# Patient Record
Sex: FEMALE | Race: WHITE | NOT HISPANIC OR LATINO | URBAN - METROPOLITAN AREA
[De-identification: names, ages, dates, MRNs, and addresses within clinical notes are randomized per-mention and may not be internally consistent; named-entity substitution may affect disease eponyms.]

---

## 2023-01-03 ENCOUNTER — TELEPHONE (OUTPATIENT)
Dept: SCHEDULING | Facility: CLINIC | Age: 43
End: 2023-01-03
Payer: COMMERCIAL

## 2023-01-03 NOTE — TELEPHONE ENCOUNTER
New Patient Appointment FYI    Name of caller: Yvonne Harmon     Reason for Visit: Possible POTS    Insurance: [employer]    Insurance ID #: [id number]    Recent Procedures: none    Referred by: Jenelle Frank     Previous Cardiologist name and phone number: none    Best contact number: [phone]     Additional notes: NPV scheduled 1/23 at 11AM.

## 2023-01-23 ENCOUNTER — OFFICE VISIT (OUTPATIENT)
Dept: CARDIOLOGY | Facility: CLINIC | Age: 43
End: 2023-01-23
Payer: COMMERCIAL

## 2023-01-23 VITALS — SYSTOLIC BLOOD PRESSURE: 134 MMHG | OXYGEN SATURATION: 99 % | HEART RATE: 61 BPM | DIASTOLIC BLOOD PRESSURE: 82 MMHG

## 2023-01-23 DIAGNOSIS — G90.A POTS (POSTURAL ORTHOSTATIC TACHYCARDIA SYNDROME): ICD-10-CM

## 2023-01-23 DIAGNOSIS — R42 DIZZINESS: Primary | ICD-10-CM

## 2023-01-23 PROBLEM — K21.9 LARYNGOPHARYNGEAL REFLUX: Status: ACTIVE | Noted: 2023-01-23

## 2023-01-23 PROBLEM — M79.7 FIBROMYALGIA: Status: ACTIVE | Noted: 2021-04-08

## 2023-01-23 PROBLEM — E55.9 VITAMIN D DEFICIENCY: Status: ACTIVE | Noted: 2022-08-01

## 2023-01-23 PROBLEM — G43.019 INTRACTABLE MIGRAINE WITHOUT AURA AND WITHOUT STATUS MIGRAINOSUS: Status: ACTIVE | Noted: 2020-10-23

## 2023-01-23 PROBLEM — F98.8 ADD (ATTENTION DEFICIT DISORDER): Status: ACTIVE | Noted: 2023-01-23

## 2023-01-23 PROBLEM — F50.819 BINGE EATING DISORDER: Status: ACTIVE | Noted: 2022-02-08

## 2023-01-23 PROBLEM — R49.0 HOARSENESS: Status: ACTIVE | Noted: 2023-01-23

## 2023-01-23 PROBLEM — R41.840 ATTENTION DEFICIT: Status: ACTIVE | Noted: 2022-02-08

## 2023-01-23 PROBLEM — J02.9 SORE THROAT: Status: ACTIVE | Noted: 2023-01-23

## 2023-01-23 PROBLEM — M25.552 LEFT HIP PAIN: Status: ACTIVE | Noted: 2020-10-23

## 2023-01-23 PROCEDURE — 93000 ELECTROCARDIOGRAM COMPLETE: CPT | Performed by: INTERNAL MEDICINE

## 2023-01-23 PROCEDURE — 99204 OFFICE O/P NEW MOD 45 MIN: CPT | Performed by: INTERNAL MEDICINE

## 2023-01-23 RX ORDER — LISDEXAMFETAMINE DIMESYLATE 50 MG/1
50 CAPSULE ORAL DAILY
COMMUNITY
Start: 2022-04-22 | End: 2023-03-29

## 2023-01-23 RX ORDER — LEVONORGESTREL 52 MG/1
1 INTRAUTERINE DEVICE INTRAUTERINE
COMMUNITY

## 2023-01-23 RX ORDER — ERGOCALCIFEROL 1.25 MG/1
1 CAPSULE ORAL WEEKLY
COMMUNITY
Start: 2022-12-29 | End: 2023-03-29

## 2023-01-23 RX ORDER — METOPROLOL SUCCINATE 50 MG/1
50 TABLET, EXTENDED RELEASE ORAL DAILY
Qty: 90 TABLET | Refills: 3 | Status: SHIPPED | OUTPATIENT
Start: 2023-01-23 | End: 2024-01-23

## 2023-01-23 RX ORDER — RIZATRIPTAN BENZOATE 10 MG/1
TABLET ORAL
COMMUNITY
Start: 2023-01-03

## 2023-01-23 ASSESSMENT — ENCOUNTER SYMPTOMS
SUSPICIOUS LESIONS: 0
LEFT EYE: 0
FLANK PAIN: 0
IRREGULAR HEARTBEAT: 0
PERSISTENT INFECTIONS: 0
LOSS OF BALANCE: 0
WHEEZING: 0
BACK PAIN: 0
HOARSE VOICE: 0
SENSORY CHANGE: 0
NEAR-SYNCOPE: 1
MYALGIAS: 0
WEAKNESS: 0
SLEEP DISTURBANCES DUE TO BREATHING: 0
ORTHOPNEA: 0
NUMBNESS: 0
SYNCOPE: 1
NIGHT SWEATS: 0
ANOREXIA: 0
RIGHT EYE: 0
POOR WOUND HEALING: 0
CHANGE IN BOWEL HABIT: 0
TREMORS: 0
BLURRED VISION: 0
JOINT SWELLING: 0
CLAUDICATION: 0
COUGH: 0
DIZZINESS: 0
SHORTNESS OF BREATH: 0
HEMATEMESIS: 0
PALPITATIONS: 1
PND: 0
DYSPNEA ON EXERTION: 0
BOWEL INCONTINENCE: 0
DECREASED APPETITE: 0

## 2023-01-23 NOTE — ASSESSMENT & PLAN NOTE
Correlating with fast HR and orthostasis.  EKG and exam are low risk.  Would defer imaging as prior TTE was normal.  Encouraged HR control and hydration as above to manage symptoms.    Counterpressure maneuvers counseling done to prevent syncope.

## 2023-01-23 NOTE — PROGRESS NOTES
.  Yvonne Harmon is a 42 y.o. female is present in the office today for new patient appt    With position changes she gets dizzy/LH/nauseous/palpitations.  Has fainted 2x and now sits down when she feels like close to syncope.  She had a prior trial of propanolol but did not improve the migraines or palpitations.      She eats balanced meals, eggs, varied lunches, meats/vegetables/carbohydrates, not drinking much water.  30-40oz a day.  She can walk without exertional symptoms.  No real improvement with exercise.  Worse symptoms when she gets hot/sweaty.  Eating more salt helps with the symptoms.      These symptoms have been going on for years, and always when standing.  HR can rise to 160BPM when standing.  When hr exceeds 138-140BPM symptoms get worse.  She didn't have improvement in Hrs with the Propranolol which was stopped when it failed to improve migraines.      Some improvement when drinking electrolyte solutions in the AM after her bout of syncope several years ago.             Past Medical History:   Diagnosis Date   • ADD (attention deficit disorder)    • Fibromyalgia    • Migraines    • Vitamin D deficiency        Past Surgical History:   Procedure Laterality Date   • APPENDECTOMY     •  SECTION     • GASTRIC BYPASS     • TONSILLECTOMY          Social History     Socioeconomic History   • Marital status:      Spouse name: None   • Number of children: None   • Years of education: None   • Highest education level: None   Tobacco Use   • Smoking status: Never   • Smokeless tobacco: Never   Substance and Sexual Activity   • Alcohol use: Not Currently   • Drug use: Not Currently       Family History   Problem Relation Age of Onset   • Hypertension Biological Mother    • Hypertension Biological Father        Patient has no known allergies.    Current Outpatient Medications   Medication Sig Dispense Refill   • ergocalciferol (VITAMIN D2) 50,000 unit(1250 mcg) capsule Take 1 capsule by mouth  once a week.     • levonorgestreL (LILETTA) 18.6 mcg/24 hrs (6 yrs) 52 mg intrauterine device intrauterine device 1 mg.     • lisdexamfetamine (VYVANSE) 50 mg capsule Take 50 mg by mouth daily.     • metoprolol succinate XL (TOPROL-XL) 50 mg 24 hr tablet Take 1 tablet (50 mg total) by mouth daily. 90 tablet 3   • rizatriptan (MAXALT) 10 mg tablet TAKE ONE TABLET BY MOUTH ONCE A DAY AS NEEDED  MIGRAINE) FOR UP TO 1 DOSE. REPEAT AFTER 2 HOURS IF NEEDED, MAXIMUM OF 2 TABLETS IN 24 HOURS.       No current facility-administered medications for this visit.       Review of Systems   Constitutional: Negative for decreased appetite and night sweats.   HENT: Negative for congestion and hoarse voice.    Eyes: Negative for blurred vision, vision loss in left eye and vision loss in right eye.   Cardiovascular: Positive for near-syncope, palpitations and syncope. Negative for chest pain, claudication, cyanosis, dyspnea on exertion, irregular heartbeat, leg swelling, orthopnea and paroxysmal nocturnal dyspnea.   Respiratory: Negative for cough, shortness of breath, sleep disturbances due to breathing and wheezing.    Hematologic/Lymphatic: Negative for bleeding problem.   Skin: Negative for poor wound healing, skin cancer and suspicious lesions.   Musculoskeletal: Negative for back pain, joint swelling and myalgias.   Gastrointestinal: Negative for anorexia, change in bowel habit, bowel incontinence, dysphagia, hematemesis and melena.   Genitourinary: Negative for flank pain and nocturia.   Neurological: Negative for dizziness, loss of balance, numbness, sensory change, tremors and weakness.   Allergic/Immunologic: Negative for HIV exposure, hives and persistent infections.          Vitals:    01/23/23 1053   BP: 134/82   Pulse: 61   SpO2: 99%       There is no height or weight on file to calculate BMI.      Physical Exam  Constitutional:       Appearance: Normal appearance. She is normal weight.   HENT:      Head: Normocephalic.       Mouth/Throat:      Mouth: Mucous membranes are moist.      Pharynx: No posterior oropharyngeal erythema.   Eyes:      Extraocular Movements: Extraocular movements intact.   Cardiovascular:      Rate and Rhythm: Regular rhythm. Bradycardia present.      Chest Wall: PMI is not displaced.      Pulses:           Carotid pulses are 2+ on the right side and 2+ on the left side.       Radial pulses are 2+ on the right side and 2+ on the left side.        Posterior tibial pulses are 2+ on the right side and 2+ on the left side.      Heart sounds: S1 normal and S2 normal. No murmur heard.    No gallop. No S3 or S4 sounds.      Comments: Increase HR variability.  Standing HR goes 90s-110s then back to 90s-110s.    Pulmonary:      Effort: Pulmonary effort is normal.      Breath sounds: Normal breath sounds.   Abdominal:      General: Abdomen is flat. Bowel sounds are normal. There is no distension.      Tenderness: There is no abdominal tenderness.   Musculoskeletal:      Cervical back: Neck supple.      Comments: Functional status is:  good   Skin:     General: Skin is warm.   Neurological:      Mental Status: She is alert and oriented to person, place, and time. Mental status is at baseline.   Psychiatric:         Attention and Perception: Attention normal.         Mood and Affect: Mood and affect normal.         Speech: Speech normal.          No results found for: WBC, RBC, HGB, HCT, MCV, MCH, MCHC, RDW, PLT     No results found for: GLUCOSE, BUN, CREATININE, EGFR, BUNCREAT, NA, K, CL, CO2, CALCIUM, PROTEIN, ALBUMIN, GLOB, AGRATIO, BILITOT, ALKPHOS, AST, ALT     No results found for: HGBA1C, ESTAVGGLUC     No results found for: ALBUMIN     No results found for: CHOL, TRIG, HDL, VLDL, LDLCALC         No results found for this or any previous visit.               Assessment/Plan        EKG: Sinus tachycardia rhythm, nonspecific ST changes, low risk EKG    POTS (postural orthostatic tachycardia syndrome)  Discussed  ACC/AHA Expert consensus guidelines for POTS:    Plan- Toprol for tachycardias- starting at 50mg and uptitrating as tolerated.      Increase electrolytes and fluids    Seated exercise can benefit suggest bike vs. Rower    Limit caffeine.      Fine to mix stimulants and beta blockers though reducing stimulants makes more sense.      Dizziness  Correlating with fast HR and orthostasis.  EKG and exam are low risk.  Would defer imaging as prior TTE was normal.  Encouraged HR control and hydration as above to manage symptoms.    Counterpressure maneuvers counseling done to prevent syncope.       I spent a total of 45min on exam, interview, documentation and counseling.      New Medications Ordered This Visit   Medications   • ergocalciferol (VITAMIN D2) 50,000 unit(1250 mcg) capsule     Sig: Take 1 capsule by mouth once a week.   • levonorgestreL (LILETTA) 18.6 mcg/24 hrs (6 yrs) 52 mg intrauterine device intrauterine device     Si mg.   • lisdexamfetamine (VYVANSE) 50 mg capsule     Sig: Take 50 mg by mouth daily.   • rizatriptan (MAXALT) 10 mg tablet     Sig: TAKE ONE TABLET BY MOUTH ONCE A DAY AS NEEDED  MIGRAINE) FOR UP TO 1 DOSE. REPEAT AFTER 2 HOURS IF NEEDED, MAXIMUM OF 2 TABLETS IN 24 HOURS.   • metoprolol succinate XL (TOPROL-XL) 50 mg 24 hr tablet     Sig: Take 1 tablet (50 mg total) by mouth daily.     Dispense:  90 tablet     Refill:  3       Medications Discontinued During This Encounter   Medication Reason   • PNV,calcium 72-iron-folic acid 27 mg iron- 1 mg tablet         Orders Placed This Encounter   Procedures   • ECG 12 LEAD-OFFICE PERFORMED     Scheduling Instructions:      PLEASE USE THIS ORDER FOR ECG'S PERFORMED IN PHYSICIAN OFFICES     Order Specific Question:   Release to patient     Answer:   Immediate        Leonel Harding MD  2023

## 2023-01-23 NOTE — PATIENT INSTRUCTIONS
Toprol for tachycardias    Increase electrolytes and fluids    Seated exercise can benefit suggest bike vs. Rower    Limit caffeine.      Can add a second tablet if desired for total dose 100mg and call me if this is better for your body.

## 2023-01-23 NOTE — ASSESSMENT & PLAN NOTE
Discussed ACC/AHA Expert consensus guidelines for POTS:    Plan- Toprol for tachycardias- starting at 50mg and uptitrating as tolerated.      Increase electrolytes and fluids    Seated exercise can benefit suggest bike vs. Rower    Limit caffeine.      Fine to mix stimulants and beta blockers though reducing stimulants makes more sense.

## 2024-10-22 ENCOUNTER — OFFICE VISIT (OUTPATIENT)
Dept: FAMILY MEDICINE CLINIC | Facility: CLINIC | Age: 44
End: 2024-10-22
Payer: COMMERCIAL

## 2024-10-22 ENCOUNTER — DOCUMENTATION (OUTPATIENT)
Dept: FAMILY MEDICINE CLINIC | Facility: CLINIC | Age: 44
End: 2024-10-22

## 2024-10-22 VITALS
SYSTOLIC BLOOD PRESSURE: 118 MMHG | HEIGHT: 64 IN | WEIGHT: 211.4 LBS | OXYGEN SATURATION: 100 % | BODY MASS INDEX: 36.09 KG/M2 | DIASTOLIC BLOOD PRESSURE: 70 MMHG | HEART RATE: 95 BPM | TEMPERATURE: 99.4 F

## 2024-10-22 DIAGNOSIS — G90.A POTS (POSTURAL ORTHOSTATIC TACHYCARDIA SYNDROME): Primary | ICD-10-CM

## 2024-10-22 DIAGNOSIS — Z13.6 SCREENING FOR CARDIOVASCULAR CONDITION: ICD-10-CM

## 2024-10-22 DIAGNOSIS — Z76.89 ENCOUNTER TO ESTABLISH CARE: ICD-10-CM

## 2024-10-22 DIAGNOSIS — G90.A POSTURAL ORTHOSTATIC TACHYCARDIA SYNDROME: Primary | ICD-10-CM

## 2024-10-22 DIAGNOSIS — L50.8 CHRONIC URTICARIA: ICD-10-CM

## 2024-10-22 DIAGNOSIS — Z13.1 SCREENING FOR DIABETES MELLITUS: ICD-10-CM

## 2024-10-22 DIAGNOSIS — M35.7 HYPERMOBILITY SYNDROME: ICD-10-CM

## 2024-10-22 DIAGNOSIS — E61.1 HYPOFERREMIA: ICD-10-CM

## 2024-10-22 PROBLEM — F41.0 PANIC ATTACK: Status: ACTIVE | Noted: 2023-05-05

## 2024-10-22 PROBLEM — Z98.84 HISTORY OF GASTRIC BYPASS: Status: ACTIVE | Noted: 2020-10-23

## 2024-10-22 PROBLEM — F50.819 BINGE EATING DISORDER: Chronic | Status: ACTIVE | Noted: 2022-02-08

## 2024-10-22 PROBLEM — E16.1 REACTIVE HYPOGLYCEMIA: Status: ACTIVE | Noted: 2020-10-22

## 2024-10-22 PROBLEM — F41.1 GAD (GENERALIZED ANXIETY DISORDER): Status: ACTIVE | Noted: 2023-08-11

## 2024-10-22 PROBLEM — F98.8 ADD (ATTENTION DEFICIT DISORDER): Status: ACTIVE | Noted: 2022-09-22

## 2024-10-22 PROBLEM — E55.9 VITAMIN D DEFICIENCY: Status: ACTIVE | Noted: 2022-08-01

## 2024-10-22 PROCEDURE — 99203 OFFICE O/P NEW LOW 30 MIN: CPT

## 2024-10-22 RX ORDER — FERROUS SULFATE 325(65) MG
1 TABLET ORAL
COMMUNITY

## 2024-10-22 RX ORDER — OMEGA-3S/DHA/EPA/FISH OIL/D3 300MG-1000
400 CAPSULE ORAL DAILY
COMMUNITY

## 2024-10-22 NOTE — PROGRESS NOTES
"Ambulatory Visit  Name: Rupinder Pressley      : 1980      MRN: [id number]  Encounter Provider: Albina Grant DO  Encounter Date: 10/22/2024   Encounter department: [facility]    Assessment & Plan  Postural orthostatic tachycardia syndrome  Suspect possible EDS, longstanding history of FM, POTS,   Multiple symptoms including worsening fatigue, MSK symptoms (muscle cramps, back pain/stiffness, disjointed joints, etc), neurologic symptoms (headache, presyncope, slurred speech/word finding difficulties/brain fog/loss of balance/coordination, fine motor difficulties), GYN symptoms (urinary frequency, low libido, painful intercourse), mental health symptoms (depression, anxiety, impulsive)  Experiencing whole-body flareups over the past year  Plan:  F/u with Dr. Newman for EDS evaluation  Discussed getting blood work that was ordered by Dr. Newman  Prostaglandins, histamine, MTHFR mutation, Thyroid antibiodies, HLA-B27, Gluten sensitivity, celiac, Mg, LULY, Vit D, Allergy panel, tryptase, vitamin b12, irone panel, aldosterone/renin, anti-DNA, RF, CCP, Leukotriene, N-methylhistamine       Encounter to establish care         Screening for cardiovascular condition    Orders:    Lipid panel; Future    Screening for diabetes mellitus    Orders:    Comprehensive metabolic panel; Future         Tobacco and Toxic Substance Assessment and Intervention:     Tobacco use screening performed    Alcohol and drug use screening performed      History of Present Illness     HPI    Pt presenting today to establish care with a new PCP. Interested in establishing with Dr. Newman for EDS evaluation      Acute Concerns:  EDS evaluation:  Reports she has been dealing with symptoms since she was a teenager.  Reports that she first thought it was growing pains, however when she hit her adult years she continued to have \" growing pains.\"  She reports ongoing migraines and headaches.  Feels like over the " "past year her flareups have been severely worse.  Described flareups as a \"whole body headache,\" and alleviated by Tylenol.  She reports extreme fatigue, noted to have difficulty getting out of bed some days.  Will quickly fatigue while doing activities.  She is no longer taking Wellbutrin or Adderall for anxiety and ADHD. Below is the list of symptoms she has been experiencing:        PMHx:  Anxiety, Depression, POTS, Fibromyalgia, ADHD, reactive hypoglycemia       Medications:  Only vitamin D and iron.     Allergies:  Hydroxyzine makes extremely tired, not a true allergy but severe side effect      Hospitalizations:  Hospitalized in 2016 after hemorrhaging after miscarriage, hospitalized for 3 days.      Surgeries:  Appendectomy- 6 years   Tonsillectomy-   C section- , twins, was  a serrogate   - miscarrage spontaneous, hemmorrhage  Gastric bypass -        Social Hx:  Caffeine use? Yes   Smokes? - denies, history of sporadic tobacco as teen  Drinks? Denies   Illicit drug use?Denies   Occupation -  at [employer]  Living situation:  and son, 18  Drives? Yes      Family Hx:  Cancer? None  Stroke: None  MI: maybe someone, unsure  HTN- Dad,   HLD- dad  Diabetes- mom and maternal grandfather      Medical History Reviewed by provider this encounter:       Past Medical History   Past Medical History:   Diagnosis Date    Asthma     Depression     Fibromyalgia     REBECCA (generalized anxiety disorder)     History of gastric bypass     Migraines     Miscarriage     POTS (postural orthostatic tachycardia syndrome)     Reactive hypoglycemia      Past Surgical History:   Procedure Laterality Date    APPENDECTOMY      8 years old     SECTION  2012    surrogate with twins    GASTRIC BYPASS      TONSILLECTOMY       Family History   Problem Relation Age of Onset    Diabetes Mother     Hypertension Father     Hyperlipidemia Father     Diabetes Maternal Grandfather      Current Outpatient " "Medications on File Prior to Visit   Medication Sig Dispense Refill    cholecalciferol (VITAMIN D3) 400 units tablet Take 400 Units by mouth daily      FeroSul 325 (65 Fe) MG tablet Take 1 tablet by mouth daily with breakfast       No current facility-administered medications on file prior to visit.     Allergies   Allergen Reactions    Hydroxyzine Other (See Comments)      Current Outpatient Medications on File Prior to Visit   Medication Sig Dispense Refill    cholecalciferol (VITAMIN D3) 400 units tablet Take 400 Units by mouth daily      FeroSul 325 (65 Fe) MG tablet Take 1 tablet by mouth daily with breakfast       No current facility-administered medications on file prior to visit.      Social History     Tobacco Use    Smoking status: Former     Types: Cigarettes    Smokeless tobacco: Never   Vaping Use    Vaping status: Never Used   Substance and Sexual Activity    Alcohol use: Never    Drug use: Never    Sexual activity: Not on file         Objective     /70 (BP Location: Right arm, Patient Position: Sitting, Cuff Size: Large)   Pulse 95   Temp 99.4 °F (37.4 °C) (Tympanic)   Ht 5' 4\" (1.626 m)   Wt 95.9 kg (211 lb 6.4 oz)   SpO2 100%   BMI 36.29 kg/m²     Physical Exam  Vitals and nursing note reviewed.   Constitutional:       General: She is not in acute distress.     Appearance: She is well-developed. She is not ill-appearing.   HENT:      Head: Normocephalic and atraumatic.   Eyes:      Conjunctiva/sclera: Conjunctivae normal.   Cardiovascular:      Rate and Rhythm: Normal rate and regular rhythm.      Heart sounds: No murmur heard.  Pulmonary:      Effort: Pulmonary effort is normal. No respiratory distress.      Breath sounds: Normal breath sounds.   Abdominal:      General: There is no distension.      Palpations: Abdomen is soft. There is no mass.      Tenderness: There is no abdominal tenderness. There is no right CVA tenderness or guarding.      Hernia: No hernia is present. "   Musculoskeletal:         General: No swelling.      Cervical back: Neck supple.      Right lower leg: No edema.      Left lower leg: No edema.   Skin:     General: Skin is warm and dry.      Capillary Refill: Capillary refill takes less than 2 seconds.   Neurological:      Mental Status: She is alert.   Psychiatric:         Mood and Affect: Mood is depressed.

## 2024-10-22 NOTE — ASSESSMENT & PLAN NOTE
Suspect possible EDS, longstanding history of FM, POTS,   Multiple symptoms including worsening fatigue, MSK symptoms (muscle cramps, back pain/stiffness, disjointed joints, etc), neurologic symptoms (headache, presyncope, slurred speech/word finding difficulties/brain fog/loss of balance/coordination, fine motor difficulties), GYN symptoms (urinary frequency, low libido, painful intercourse), mental health symptoms (depression, anxiety, impulsive)  Experiencing whole-body flareups over the past year  Plan:  F/u with Dr. Newman for EDS evaluation  Discussed getting blood work that was ordered by Dr. Newman  Prostaglandins, histamine, MTHFR mutation, Thyroid antibiodies, HLA-B27, Gluten sensitivity, celiac, Mg, LULY, Vit D, Allergy panel, tryptase, vitamin b12, irone panel, aldosterone/renin, anti-DNA, RF, CCP, Leukotriene, N-methylhistamine

## 2024-10-24 LAB
25(OH)D3+25(OH)D2 SERPL-MCNC: 20 NG/ML (ref 30–100)
ALBUMIN SERPL-MCNC: 4.6 G/DL (ref 3.9–4.9)
ALP SERPL-CCNC: 82 IU/L (ref 44–121)
ALT SERPL-CCNC: 22 IU/L (ref 0–32)
ANA TITR SER IF: NEGATIVE {TITER}
AST SERPL-CCNC: 17 IU/L (ref 0–40)
BILIRUB SERPL-MCNC: 0.5 MG/DL (ref 0–1.2)
BUN SERPL-MCNC: 10 MG/DL (ref 6–24)
BUN/CREAT SERPL: 14 (ref 9–23)
CALCIUM SERPL-MCNC: 9.5 MG/DL (ref 8.7–10.2)
CCP IGA+IGG SERPL IA-ACNC: 9 UNITS (ref 0–19)
CHLORIDE SERPL-SCNC: 105 MMOL/L (ref 96–106)
CHOLEST SERPL-MCNC: 164 MG/DL (ref 100–199)
CO2 SERPL-SCNC: 19 MMOL/L (ref 20–29)
CREAT SERPL-MCNC: 0.72 MG/DL (ref 0.57–1)
DSDNA AB SER-ACNC: <1 IU/ML (ref 0–9)
EGFR: 107 ML/MIN/1.73
GLOBULIN SER-MCNC: 2.1 G/DL (ref 1.5–4.5)
GLUCOSE SERPL-MCNC: 82 MG/DL (ref 70–99)
HDLC SERPL-MCNC: 73 MG/DL
IRON SATN MFR SERPL: 25 % (ref 15–55)
IRON SERPL-MCNC: 113 UG/DL (ref 27–159)
LDLC SERPL CALC-MCNC: 78 MG/DL (ref 0–99)
MAGNESIUM SERPL-MCNC: 2 MG/DL (ref 1.6–2.3)
POTASSIUM SERPL-SCNC: 4.5 MMOL/L (ref 3.5–5.2)
PROT SERPL-MCNC: 6.7 G/DL (ref 6–8.5)
RHEUMATOID FACT SERPL-ACNC: <10 IU/ML
SL AMB VLDL CHOLESTEROL CALC: 13 MG/DL (ref 5–40)
SODIUM SERPL-SCNC: 142 MMOL/L (ref 134–144)
THYROGLOB AB SERPL-ACNC: <1 IU/ML (ref 0–0.9)
THYROPEROXIDASE AB SERPL-ACNC: 12 IU/ML (ref 0–34)
TIBC SERPL-MCNC: 453 UG/DL (ref 250–450)
TRIGL SERPL-MCNC: 66 MG/DL (ref 0–149)
UIBC SERPL-MCNC: 340 UG/DL (ref 131–425)
VIT B12 SERPL-MCNC: 432 PG/ML (ref 232–1245)

## 2024-10-27 LAB
A ALTERNATA IGE QN: NORMAL
A FUMIGATUS IGE QN: NORMAL
ALDOST SERPL-MCNC: NORMAL NG/DL
ALDOST/RENIN PLAS-RTO: NORMAL {RATIO}
BERMUDA GRASS IGE QN: NORMAL
BOXELDER IGE QN: NORMAL
C HERBARUM IGE QN: NORMAL
CAT DANDER IGE QN: NORMAL
CMN PIGWEED IGE QN: NORMAL
COMMON RAGWEED IGE QN: NORMAL
COTTONWOOD IGE QN: NORMAL
D FARINAE IGE QN: NORMAL
D PTERONYSS IGE QN: NORMAL
DOG DANDER IGE QN: NORMAL
ENDOMYSIUM IGA SER QL: NEGATIVE
GLIADIN IGG SER IA-ACNC: NORMAL
GLIADIN PEPTIDE IGG SER-ACNC: 2 UNITS (ref 0–19)
HISTAMINE SERPL-MCNC: NORMAL NG/ML
HLA-B27 QL NAA+PROBE: NORMAL
IGA SERPL-MCNC: 175 MG/DL (ref 87–352)
IGE SERPL-ACNC: NORMAL [IU]/L
LONDON PLANE IGE QN: NORMAL
Lab: NORMAL
Lab: NORMAL
MOUSE URINE PROT IGE QN: NORMAL
MT JUNIPER IGE QN: NORMAL
MTHFR GENE MUT ANL BLD/T: NORMAL
MUGWORT IGE QN: NORMAL
PENICILLIUM CHRYSOGENUM: NORMAL
PROSTAGLANDIN D2 SERPL-MCNC: NORMAL PG/ML
RENIN PLAS-CCNC: NORMAL NG/ML/H
ROACH IGE QN: NORMAL
SHEEP SORREL IGE QN: NORMAL
SILVER BIRCH IGE QN: NORMAL
TEST INFORMATION: NORMAL
TIMOTHY IGE QN: NORMAL
TRYPTASE SERPL-MCNC: 3.8 UG/L (ref 2.2–13.2)
TTG IGA SER-ACNC: <2 U/ML (ref 0–3)
TTG IGA SER-ACNC: NORMAL U/ML
WALNUT IGE: NORMAL
WHITE ASH IGE QN: NORMAL
WHITE ELM IGE QN: NORMAL
WHITE MULBERRY IGE QN: NORMAL
WHITE OAK IGE QN: NORMAL

## 2024-10-28 LAB
ALDOST SERPL-MCNC: 1.1 NG/DL (ref 0–30)
ALDOST/RENIN PLAS-RTO: 3.4 {RATIO} (ref 0–30)
RENIN PLAS-CCNC: 0.32 NG/ML/HR (ref 0.17–5.38)

## 2024-10-29 LAB
A ALTERNATA IGE QN: <0.1 KU/L
A FUMIGATUS IGE QN: <0.1 KU/L
BERMUDA GRASS IGE QN: <0.1 KU/L
BOXELDER IGE QN: <0.1 KU/L
C HERBARUM IGE QN: <0.1 KU/L
CAT DANDER IGE QN: <0.1 KU/L
CMN PIGWEED IGE QN: <0.1 KU/L
COMMON RAGWEED IGE QN: <0.1 KU/L
COTTONWOOD IGE QN: <0.1 KU/L
D FARINAE IGE QN: <0.1 KU/L
D PTERONYSS IGE QN: <0.1 KU/L
DOG DANDER IGE QN: <0.1 KU/L
GLIADIN IGG SER IA-ACNC: 30 UNITS (ref 0–19)
GLIADIN PEPTIDE IGG SER-ACNC: 2 UNITS (ref 0–19)
HLA-B27 QL NAA+PROBE: NEGATIVE
IGE SERPL-ACNC: 16 IU/ML (ref 6–495)
LONDON PLANE IGE QN: <0.1 KU/L
Lab: NORMAL
MOUSE URINE PROT IGE QN: <0.1 KU/L
MT JUNIPER IGE QN: <0.1 KU/L
MUGWORT IGE QN: <0.1 KU/L
NOTE: NORMAL
PENICILLIUM CHRYSOGENUM: <0.1 KU/L
ROACH IGE QN: <0.1 KU/L
SHEEP SORREL IGE QN: <0.1 KU/L
SILVER BIRCH IGE QN: <0.1 KU/L
TEST INFORMATION: NORMAL
TIMOTHY IGE QN: <0.1 KU/L
TTG IGA SER-ACNC: <2 U/ML (ref 0–3)
WALNUT IGE: <0.1 KU/L
WHITE ASH IGE QN: <0.1 KU/L
WHITE ELM IGE QN: <0.1 KU/L
WHITE MULBERRY IGE QN: <0.1 KU/L
WHITE OAK IGE QN: <0.1 KU/L

## 2024-10-30 LAB — HISTAMINE SERPL-MCNC: 0.54 NG/ML

## 2024-10-31 LAB
ENDOMYSIUM IGA SER QL: NEGATIVE
IGA SERPL-MCNC: 175 MG/DL (ref 87–352)
Lab: NORMAL
MTHFR GENE MUT ANL BLD/T: NORMAL
PROSTAGLANDIN D2 SERPL-MCNC: NORMAL PG/ML
TTG IGA SER-ACNC: NORMAL U/ML

## 2024-11-04 LAB
ENDOMYSIUM IGA SER QL: NEGATIVE
IGA SERPL-MCNC: 175 MG/DL (ref 87–352)
PROSTAGLANDIN D2 SERPL-MCNC: 8.4 PG/ML
TTG IGA SER-ACNC: NORMAL U/ML

## 2024-12-16 ENCOUNTER — OFFICE VISIT (OUTPATIENT)
Dept: FAMILY MEDICINE CLINIC | Facility: CLINIC | Age: 44
End: 2024-12-16
Payer: COMMERCIAL

## 2024-12-16 VITALS
SYSTOLIC BLOOD PRESSURE: 132 MMHG | HEART RATE: 100 BPM | TEMPERATURE: 97.5 F | DIASTOLIC BLOOD PRESSURE: 82 MMHG | OXYGEN SATURATION: 99 % | WEIGHT: 216.6 LBS | HEIGHT: 64 IN | BODY MASS INDEX: 36.98 KG/M2

## 2024-12-16 DIAGNOSIS — Q79.62 HYPERMOBILE EHLERS-DANLOS SYNDROME: Primary | ICD-10-CM

## 2024-12-16 DIAGNOSIS — R10.9 VISCERAL ABDOMINAL PAIN: ICD-10-CM

## 2024-12-16 DIAGNOSIS — J45.20 MILD INTERMITTENT ASTHMA WITHOUT COMPLICATION: ICD-10-CM

## 2024-12-16 DIAGNOSIS — Z98.84 HISTORY OF GASTRIC BYPASS: ICD-10-CM

## 2024-12-16 DIAGNOSIS — G89.29 CHRONIC PAIN OF RIGHT KNEE: ICD-10-CM

## 2024-12-16 DIAGNOSIS — M25.561 CHRONIC PAIN OF RIGHT KNEE: ICD-10-CM

## 2024-12-16 DIAGNOSIS — Z12.31 ENCOUNTER FOR SCREENING MAMMOGRAM FOR BREAST CANCER: ICD-10-CM

## 2024-12-16 DIAGNOSIS — E55.9 VITAMIN D DEFICIENCY: ICD-10-CM

## 2024-12-16 DIAGNOSIS — Z87.59 HISTORY OF MISCARRIAGE: ICD-10-CM

## 2024-12-16 DIAGNOSIS — Z15.89 MTHFR MUTATION: ICD-10-CM

## 2024-12-16 DIAGNOSIS — T14.8XXA BRUISING: ICD-10-CM

## 2024-12-16 DIAGNOSIS — D89.40 MAST CELL ACTIVATION (HCC): ICD-10-CM

## 2024-12-16 DIAGNOSIS — E53.8 LOW SERUM VITAMIN B12: ICD-10-CM

## 2024-12-16 DIAGNOSIS — E66.9 OBESITY (BMI 35.0-39.9 WITHOUT COMORBIDITY): ICD-10-CM

## 2024-12-16 DIAGNOSIS — I83.813 VARICOSE VEINS OF BILATERAL LOWER EXTREMITIES WITH PAIN: ICD-10-CM

## 2024-12-16 DIAGNOSIS — E61.1 HYPOFERREMIA: ICD-10-CM

## 2024-12-16 DIAGNOSIS — G43.009 MIGRAINE WITHOUT AURA AND WITHOUT STATUS MIGRAINOSUS, NOT INTRACTABLE: ICD-10-CM

## 2024-12-16 DIAGNOSIS — G90.A POTS (POSTURAL ORTHOSTATIC TACHYCARDIA SYNDROME): ICD-10-CM

## 2024-12-16 PROCEDURE — 99215 OFFICE O/P EST HI 40 MIN: CPT | Performed by: FAMILY MEDICINE

## 2024-12-16 PROCEDURE — 96372 THER/PROPH/DIAG INJ SC/IM: CPT

## 2024-12-16 RX ORDER — AMITRIPTYLINE HYDROCHLORIDE 10 MG/1
10 TABLET ORAL
Qty: 30 TABLET | Refills: 0 | Status: SHIPPED | OUTPATIENT
Start: 2024-12-16 | End: 2024-12-17 | Stop reason: SDUPTHER

## 2024-12-16 RX ORDER — CYANOCOBALAMIN 1000 UG/ML
1000 INJECTION, SOLUTION INTRAMUSCULAR; SUBCUTANEOUS ONCE
Status: COMPLETED | OUTPATIENT
Start: 2024-12-16 | End: 2024-12-16

## 2024-12-16 RX ORDER — ERGOCALCIFEROL 1.25 MG/1
50000 CAPSULE, LIQUID FILLED ORAL WEEKLY
Qty: 12 CAPSULE | Refills: 3 | Status: SHIPPED | OUTPATIENT
Start: 2024-12-16 | End: 2024-12-17 | Stop reason: SDUPTHER

## 2024-12-16 RX ORDER — CYANOCOBALAMIN 1000 UG/ML
1000 INJECTION, SOLUTION INTRAMUSCULAR; SUBCUTANEOUS
Status: SHIPPED | OUTPATIENT
Start: 2024-12-16

## 2024-12-16 RX ORDER — ATENOLOL 25 MG/1
25 TABLET ORAL DAILY
Qty: 30 TABLET | Refills: 0 | Status: SHIPPED | OUTPATIENT
Start: 2024-12-16 | End: 2024-12-17 | Stop reason: SDUPTHER

## 2024-12-16 RX ADMIN — CYANOCOBALAMIN 1000 MCG: 1000 INJECTION, SOLUTION INTRAMUSCULAR; SUBCUTANEOUS at 11:15

## 2024-12-16 RX ADMIN — CYANOCOBALAMIN 1000 MCG: 1000 INJECTION, SOLUTION INTRAMUSCULAR; SUBCUTANEOUS at 11:34

## 2024-12-16 NOTE — PROGRESS NOTES
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  Trial amitryptilline   Hip stabilizer brace or SI joint belt off amazon     Discussed preservation of fingers by using whole hand rather then pushing things with one finger, avoid carrying heavy bags. Poor proprioception presents as over gripping pencils, steering wheel, etc. Can use compression gloves to improve your hand proprioception when you are typing or writing, can use bumpy steering wheel cover in car. Can do theraputty program which theraputty can be purchased on amazon. Can use finger splints or ring splints. If thumb is an issue can use CMC thumb stabilizer brace on amazon.     Orders:    amitriptyline (ELAVIL) 10 mg tablet; Take 1 tablet (10 mg total) by mouth daily at bedtime    Encounter for screening mammogram for breast cancer    Orders:    Mammo screening bilateral w 3d and cad; Future    POTS (postural orthostatic tachycardia syndrome)  Failed metoprolol. Trial atenolol 25 mg.     POTS/dysautonomia recommendations:   Wear abdominal binder or shape wear or 15 to 20 mmHg knee-high compression socks.  Literature shows that abdominal binders are more effective than compression socks.  Drink 6 to 8 ounces of water every 2-3 hours instead of sipping on water throughout the day as this helps increase your blood pressure.  Consider sitting when you get ready in the morning as this can be a time when your tachycardia is the worst.  Take 1 to 3 g of salt daily.  You can take sodium chloride 1000 mg(1g)  tablets 3 times a day or you can take up to 4 Vitassium capsules daily that come in 500 mg for 750 mg.  You can also add electrolytes to your water by other means  such as LMNT, Liquid IV, Vitassium powder, Propel packets, Gatorade zero packets, or BUOY drops.   Try to stay physically active, exercise is good in the pool or in recumbent bike, when you exercise you should only be tired for 3 hours after and if it is longer than that you may need to step down on the amount of time that you are exercising.   Dysautonomia symptoms may flare during menstrual periods, illnesses, stress.     Orders:    atenolol (TENORMIN) 25 mg tablet; Take 1 tablet (25 mg total) by mouth daily    Hypoferremia    Orders:    Ferritin; Future    Visceral abdominal pain  Consideration for Famotidine.   Orders:    amitriptyline (ELAVIL) 10 mg tablet; Take 1 tablet (10 mg total) by mouth daily at bedtime    Bruising    Orders:    Ferritin; Future    Factor V Leiden Mutation; Future    Cardiolipin antibody; Future    Von Willebrand antigen; Future    Migraine without aura and without status migrainosus, not intractable  Continue ubevly acute - this is ideal as it is a CGRP that blocks mast cell activation        MTHFR mutation  Until recently, hypermobile EDS was the only subtype without a known genetic cause.   But a recent study from [facility] has suggested that the condition may be caused by genetic mutations that impair the body’s ability to process folate.     The paper, “Folate-dependent hypermobility syndrome: A proposed mechanism and diagnosis” is based on the researchers’ observations that a large proportion of the hypermobile patients had elevated folate levels. (High folate levels can often mean MTHFR polymorphisms, as decreased MTHFR activity impairs the body's ability to break down folic acid, leading to a buildup of folic acid in the body.)     The researchers found a connection between low intracellular folate and hypermobility, indicating that MTHFR polymorphisms may be involved in EDS.     The theory is that poor folate metabolism in the body leads to high levels of an enzyme  called matrix metallopeptidase 2 (MMP2). MMP2 is responsible for cleaving (cutting up) a molecule called decorin. Decorin is a type of protein that acts like the ‘glue’ holding the extracellular matrix (ECM) together. The ECM is the mesh of structural proteins that gives shape to body tissues by providing a scaffolding to hold cells in place. It’s involved in collagen fiber formation of all fibrous connective tissue, including the skin, cornea, tendons, and cartilage.    Higher levels of MMP2 means that cells in the extracellular matrix become disorganized. [4] Connective tissue is held together more loosely, and there is increased thickening or scarring of the tissue.      According to the researchers, the potential solution may be supplementation with active folate, also known as methylfolate.      Potential solutions for EDS treatment      The high proportion of MTHFR polymorphisms among EDS patients in the above study has led to the theory that poor folate metabolism may play a part. The MTHFR gene mutation impairs the body’s ability to break down folic acid in food and supplements, which leads to low levels of active folate. Previous studies in animals have supported this idea, with evidence that one way to ‘turn off’ the MMP2 enzyme is with 5-methylTHF, or methylfolate. [5] Other studies have found that folic acid deficiency causes a marked impairment in collagen synthesis. [6]     For this reason, researchers believe that addressing folate deficiency may help to ease symptoms of hypermobile EDS. While further studies will be needed to test this idea definitively, there are numerous benefits to supplementing with active folate, also known as methylfolate. Methylfolate is an effective way to restore a folate deficiency, including in those with MTHFR mutations. [7] Active folate is also required for reducing levels of homocysteine in the blood, which in turn reduces homocysteine-related inflammation. [8]    You  are also at risk for Vit B12 deficiency and should supplement with methylcolbamin (B12)    Incorporating folate-rich foods into your nutritional plan is a great way to support folate levels.   Folate-rich foods include leafy greens, legumes, dairy products, seafood, eggs, red meat, poultry, and wholegrains. Healthy lifestyle habits such as good sleep hygiene and stress management can also help to support overall wellbeing.              Vitamin D deficiency    Orders:    ergocalciferol (VITAMIN D2) 50,000 units; Take 1 capsule (50,000 Units total) by mouth once a week    History of miscarriage    Orders:    Factor V Leiden Mutation; Future    Cardiolipin antibody; Future    Von Willebrand antigen; Future    Mast cell activation (HCC)  Start berbine 500 - 1200 mg daily     A useful approach for mast cell activation:   1. Avoid environmental exposures (e.g. mold, pollen, strong odors)   2. Avoid histamine-rich foods (e.g. avocado, cheese, eggplant, spinach, sardines, spices, tomatoes) Gluten and dairy may also be triggers.   3. Vitamin D3 (2,000 IU/day)   4. FibroProtek (2 softgels, twice/day from [employer] or www.amazon.com) or liquid quercetin (drops of nature on amazon).   5. Berberine (500 mg/day)-  6. Ashwagandha (500 mg/day)-to reduce stress   7. Antihistamine (H1) such as cetirizine, loratadine, allegra, Xzyal or diphenhydramine (dye free liquid benadryl (diphenhydramine- zyquil)  8. Antihistamine (H1, also ant-PAF and mast cell inhibitorrupatadine (10 mg/day, Rupafin in the [place] or Rupall from an online [place] pharmacy)   9. Antihistamine (H2) Famotidine (Pepcid, 20 mg/day)   10. DARLIN (Diamine oxidase 15 min before meals  or bedtime from www.amazon.com)   11. BrainGain (2 softgels, twice/day from [employer] or [employer]) for brain fog and for any MTHFR polymorphisms   12. GentleDerm (skin lotion, apply on affected skin areas as needed, twice/day) from [employer] or www.[place])  13. If  you are waking up between 3-4 am and unable to fall back asleep try liquid dye free zyquil with diphenhydramine at bed or DARLIN at bedtime.   14. Delayed release Vitamin C 500 mg BID    Symptoms of MCAS:   Swelling: Often in the face, lips, eyes, tongue, or throat   Itching: Hives or a nettle rash   Gastrointestinal issues: Constipation, diarrhea, or abdominal pain   Breathing problems: Shortness of breath, wheezing, or difficulty breathing   Neurological symptoms: Headaches, memory problems, balance problems, brain fog, or anxiety   Other symptoms: Low blood pressure, rapid pulse, fainting, weakness, joint pain, nausea, vomiting, chills, eye irritation, or skin writing     MCAS episodes can be triggered by a number of things, including:  Heat, cold, or sudden temperature changes  Stress  Exercise  Food or beverages, including alcohol  Drugs  Natural odors, chemical odors, perfumes, and scents  Infections  Mechanical irritation, friction, vibration  Sun/sunlight          Varicose veins of bilateral lower extremities with pain    Orders:    Compression Stocking    Low serum vitamin B12    Orders:    cyanocobalamin injection 1,000 mcg    History of gastric bypass    Orders:    cyanocobalamin injection 1,000 mcg    Obesity (BMI 35.0-39.9 without comorbidity)  Prior Authorization Clinical Questions for Weight Management Pharmacotherapy    1. Does the patient have a contrainidcation to medication prescribed for weight management?: No  2. Does the patient have a diagnosis of obesity, confirmed by a BMI greater than or equal to 30 kg/m^2?: Yes  3. Does the patient have a BMI of greater than or equal to 27 kg/m^2 with at least one weight-related comorbidity/risk factor/complication (e.g. diabetes, dyslipidemia, coronary artery disease)?: No  4. Weight-related co-morbidities/risk factors: osteoarthritis, depression, asthma/reactive airway disease  5. Has the patient been on a weight loss regimen of low-calorie diet, increased  physical activity, and lifestyle modifications for a minimum of 6 months?: Yes  6. Has the patient completed a comprehensive weight loss program (ie, Weight Watchers, Noom, Bariatrics, other socorro on phone)? If so, what?: Yes   -- Q6 Further Explanation: history of gastric bypass [place] Y   7. Does the patient have a history of type 2 diabetes?: No  8. Has the member tried and failed other weight loss medication within the past 12 months?: Yes   -- Q8 Further explanation: previously on vyvance for appetite suppression   9. Will the member use requested medication in combination with another GLP agonist or weight loss drug?: No  10. Is the medication a controlled substance?: No  For renewals: Has the patient had a positive outcome with current weight management medication (i.e., change in body weight of at least 4-5% after 12-16 weeks on maximally tolerated dose)?: No     Baseline weight (in pounds): 216 lbs                     History of Present Illness     This is a 44 y.o. yo female who presents for hypermobility assessment:       MSK:   Subluxations/dislocations: yes Locations:  hip  Joint pain: yes locations: shoulder, hand, hip, knee, lumbar spine  Widespread pain: yes for years , was previous diagnosed with fibromyalgia.   Hand Pain: yes, no thumb pain, PIP joints of both hands. Writing makes hand pain worse.     GI:   GI symptoms: nausea, abdominal pain, diarrhea, and food intolerance - Gluten makes symptoms worse     Cardiac:   Cardiac Symptoms of Orthostatic intolerance: chest pressure/discomfort, dyspnea, fatigue, near-syncope, palpitations, and syncope - symptomatic times a day. Shaking after eating. Moddling of legs. Previously up to 170 with standing.   Previous diagnosis of POT: yes - tried metoprolol. She eats a lot of salt in diet. Sometimes crave salt.   Palpations: yes  Shortness of Breath: yes    Skin:  Skin rashes: no, pruritus worse after shower   Latex allergy: no  Medical tape  "reaction:no  Other skin reactions to anything topical: no  Allergy testing: no - never anaphylaxis     Reactions to exposures:  Strong Smells:yes - HEadache   Mold Exposure: no  Dust Exposure: yes    GYN G4 - 3 surrogacy babies, last pregnancy 2016. Miscarriages 2   Heavy Periods: yes  PMMD: yes  Painful Periods: no  Irregular Periods: no  Trouble Urinating or incontinence: yes - urgency no bladder  Painful intercourse     Pertinent Family History:  Brain or Aortic Aneurysm : no  Colon rupture: no  Uterine Rupture: no  Personal history of Hip dysplasia or club foot: no    Neuro:   Headaches: yes - migraines - Three times a month - take Ubrevly for acute attack - nasal cromolyn spray   Preventatives tried: topomax, injection Ajovy   Fatigue: yes  Brain Fog: yes  Neuropathy Symptoms : no    Vascular:   Varicose Veins: yes  + bruise     Previous Genetic testing done: no   Previous Imaging: no  Previous Echo: no                       Review of Systems   Constitutional:  Positive for fatigue.   All other systems reviewed and are negative.    Objective   /82   Pulse 100   Temp 97.5 °F (36.4 °C)   Ht 5' 4\" (1.626 m)   Wt 98.2 kg (216 lb 9.6 oz)   SpO2 99%   BMI 37.18 kg/m²      Physical Exam  Constitutional:       Appearance: Normal appearance.   Cardiovascular:      Rate and Rhythm: Normal rate.   Musculoskeletal:      Comments: Beighton score positive   Hyperextension of finger PIP joints    Skin:     Findings: No rash.      Comments: Hyperextensible and soft and velvety skin    atrophic scarring present    Neurological:      General: No focal deficit present.      Mental Status: She is alert and oriented to person, place, and time.   Psychiatric:         Mood and Affect: Mood normal.         Behavior: Behavior normal.   Administrative Statements   I have spent a total time of 60 minutes in caring for this patient on the day of the visit/encounter including Prognosis, Risks and benefits of tx options, " Instructions for management, Patient and family education, Impressions, Counseling / Coordination of care, Documenting in the medical record, Reviewing / ordering tests, medicine, procedures  , and Obtaining or reviewing history  .

## 2024-12-16 NOTE — PATIENT INSTRUCTIONS
Discussed preservation of fingers by using whole hand rather then pushing things with one finger, avoid carrying heavy bags. Poor proprioception presents as over gripping pencils, steering wheel, etc. Can use compression gloves to improve your hand proprioception when you are typing or writing, can use bumpy steering wheel cover in car. Can do theraputty program which theraputty can be purchased on amazon. Can use finger splints or ring splints. If thumb is an issue can use CMC thumb stabilizer brace on amazon.       Until recently, hypermobile EDS was the only subtype without a known genetic cause.   But a recent study from [facility] has suggested that the condition may be caused by genetic mutations that impair the body’s ability to process folate.     The paper, “Folate-dependent hypermobility syndrome: A proposed mechanism and diagnosis” is based on the researchers’ observations that a large proportion of the hypermobile patients had elevated folate levels. (High folate levels can often mean MTHFR polymorphisms, as decreased MTHFR activity impairs the body's ability to break down folic acid, leading to a buildup of folic acid in the body.)     The researchers found a connection between low intracellular folate and hypermobility, indicating that MTHFR polymorphisms may be involved in EDS.     The theory is that poor folate metabolism in the body leads to high levels of an enzyme called matrix metallopeptidase 2 (MMP2). MMP2 is responsible for cleaving (cutting up) a molecule called decorin. Decorin is a type of protein that acts like the ‘glue’ holding the extracellular matrix (ECM) together. The ECM is the mesh of structural proteins that gives shape to body tissues by providing a scaffolding to hold cells in place. It’s involved in collagen fiber formation of all fibrous connective tissue, including the skin, cornea, tendons, and cartilage.    Higher levels of MMP2 means that cells in the extracellular  matrix become disorganized. [4] Connective tissue is held together more loosely, and there is increased thickening or scarring of the tissue.      According to the researchers, the potential solution may be supplementation with active folate, also known as methylfolate.      Potential solutions for EDS treatment      The high proportion of MTHFR polymorphisms among EDS patients in the above study has led to the theory that poor folate metabolism may play a part. The MTHFR gene mutation impairs the body’s ability to break down folic acid in food and supplements, which leads to low levels of active folate. Previous studies in animals have supported this idea, with evidence that one way to ‘turn off’ the MMP2 enzyme is with 5-methylTHF, or methylfolate. [5] Other studies have found that folic acid deficiency causes a marked impairment in collagen synthesis. [6]     For this reason, researchers believe that addressing folate deficiency may help to ease symptoms of hypermobile EDS. While further studies will be needed to test this idea definitively, there are numerous benefits to supplementing with active folate, also known as methylfolate. Methylfolate is an effective way to restore a folate deficiency, including in those with MTHFR mutations. [7] Active folate is also required for reducing levels of homocysteine in the blood, which in turn reduces homocysteine-related inflammation. [8]    You are also at risk for Vit B12 deficiency and should supplement with methylcolbamin (B12)    Incorporating folate-rich foods into your nutritional plan is a great way to support folate levels.   Folate-rich foods include leafy greens, legumes, dairy products, seafood, eggs, red meat, poultry, and wholegrains. Healthy lifestyle habits such as good sleep hygiene and stress management can also help to support overall wellbeing.       A useful approach for mast cell activation:   1. Avoid environmental exposures (e.g. mold, pollen,  strong odors)   2. Avoid histamine-rich foods (e.g. avocado, cheese, eggplant, spinach, sardines, spices, tomatoes) Gluten and dairy may also be triggers.   3. Vitamin D3 (2,000 IU/day)   4. FibroProtek (2 softgels, twice/day from www.[employer] or www.amazon.com) or liquid quercetin (drops of nature on amazon).   5. Berberine (500 mg/day)-if exposed to mold  6. Ashwagandha (500 mg/day)-to reduce stress   7. Antihistamine (H1) such as cetirizine, loratadine, allegra, Xzyal or diphenhydramine (dye free liquid benadryl (diphenhydramine- zyquil)  8. Antihistamine (H1, also ant-PAF and mast cell inhibitorrupatadine (10 mg/day, Rupafin in the [place] or Rupall from an online [place] pharmacy)   9. Antihistamine (H2) Famotidine (Pepcid, 20 mg/day)   10. DARLIN (Diamine oxidase 15 min before meals  or bedtime from www.amazon.com)   11. BrainGain (2 softgels, twice/day from [employer] or [employer]) for brain fog and for any MTHFR polymorphisms   12. GentleDerm (skin lotion, apply on affected skin areas as needed, twice/day) from [employer] or [employer])  13. If you are waking up between 3-4 am and unable to fall back asleep try liquid dye free zyquil with diphenhydramine at bed or DARLIN at bedtime.   14. Delayed release Vitamin C 500 mg BID    Symptoms of MCAS:   Swelling: Often in the face, lips, eyes, tongue, or throat   Itching: Hives or a nettle rash   Gastrointestinal issues: Constipation, diarrhea, or abdominal pain   Breathing problems: Shortness of breath, wheezing, or difficulty breathing   Neurological symptoms: Headaches, memory problems, balance problems, brain fog, or anxiety   Other symptoms: Low blood pressure, rapid pulse, fainting, weakness, joint pain, nausea, vomiting, chills, eye irritation, or skin writing     MCAS episodes can be triggered by a number of things, including:  Heat, cold, or sudden temperature changes  Stress  Exercise  Food or beverages, including alcohol  Drugs  Natural  odors, chemical odors, perfumes, and scents  Infections  Mechanical irritation, friction, vibration  Sun/sunlight       POTS/dysautonomia recommendations:   Wear abdominal binder or shape wear or 15 to 20 mmHg knee-high compression socks.  Literature shows that abdominal binders are more effective than compression socks.  Drink 6 to 8 ounces of water every 2-3 hours instead of sipping on water throughout the day as this helps increase your blood pressure.  Consider sitting when you get ready in the morning as this can be a time when your tachycardia is the worst.  Take 1 to 3 g of salt daily.  You can take sodium chloride 1000 mg(1g)  tablets 3 times a day or you can take up to 4 Vitassium capsules daily that come in 500 mg for 750 mg.  You can also add electrolytes to your water by other means such as LMNT, Liquid IV, Vitassium powder, Propel packets, Gatorade zero packets, or BUOY drops.   Try to stay physically active, exercise is good in the pool or in recumbent bike, when you exercise you should only be tired for 3 hours after and if it is longer than that you may need to step down on the amount of time that you are exercising.   Dysautonomia symptoms may flare during menstrual periods, illnesses, stress.

## 2024-12-16 NOTE — ASSESSMENT & PLAN NOTE
Prior Authorization Clinical Questions for Weight Management Pharmacotherapy    1. Does the patient have a contrainidcation to medication prescribed for weight management?: No  2. Does the patient have a diagnosis of obesity, confirmed by a BMI greater than or equal to 30 kg/m^2?: Yes  3. Does the patient have a BMI of greater than or equal to 27 kg/m^2 with at least one weight-related comorbidity/risk factor/complication (e.g. diabetes, dyslipidemia, coronary artery disease)?: No  4. Weight-related co-morbidities/risk factors: osteoarthritis, depression, asthma/reactive airway disease  5. Has the patient been on a weight loss regimen of low-calorie diet, increased physical activity, and lifestyle modifications for a minimum of 6 months?: Yes  6. Has the patient completed a comprehensive weight loss program (ie, Weight Watchers, Noom, Bariatrics, other socorro on phone)? If so, what?: Yes   -- Q6 Further Explanation: history of gastric bypass Rouy en Y   7. Does the patient have a history of type 2 diabetes?: No  8. Has the member tried and failed other weight loss medication within the past 12 months?: Yes   -- Q8 Further explanation: previously on vyvance for appetite suppression   9. Will the member use requested medication in combination with another GLP agonist or weight loss drug?: No  10. Is the medication a controlled substance?: No  For renewals: Has the patient had a positive outcome with current weight management medication (i.e., change in body weight of at least 4-5% after 12-16 weeks on maximally tolerated dose)?: No     Baseline weight (in pounds): 216 lbs

## 2024-12-17 ENCOUNTER — NURSE TRIAGE (OUTPATIENT)
Age: 44
End: 2024-12-17

## 2024-12-17 DIAGNOSIS — R10.9 VISCERAL ABDOMINAL PAIN: ICD-10-CM

## 2024-12-17 DIAGNOSIS — Q79.62 HYPERMOBILE EHLERS-DANLOS SYNDROME: ICD-10-CM

## 2024-12-17 DIAGNOSIS — E55.9 VITAMIN D DEFICIENCY: ICD-10-CM

## 2024-12-17 DIAGNOSIS — G90.A POTS (POSTURAL ORTHOSTATIC TACHYCARDIA SYNDROME): ICD-10-CM

## 2024-12-17 RX ORDER — ATENOLOL 25 MG/1
25 TABLET ORAL DAILY
Qty: 30 TABLET | Refills: 0 | Status: SHIPPED | OUTPATIENT
Start: 2024-12-17 | End: 2025-01-16

## 2024-12-17 RX ORDER — ERGOCALCIFEROL 1.25 MG/1
50000 CAPSULE, LIQUID FILLED ORAL WEEKLY
Qty: 12 CAPSULE | Refills: 3 | Status: SHIPPED | OUTPATIENT
Start: 2024-12-17

## 2024-12-17 RX ORDER — AMITRIPTYLINE HYDROCHLORIDE 10 MG/1
10 TABLET ORAL
Qty: 30 TABLET | Refills: 0 | Status: SHIPPED | OUTPATIENT
Start: 2024-12-17 | End: 2025-01-16

## 2024-12-17 NOTE — TELEPHONE ENCOUNTER
"Reason for Disposition   Prescription prescribed recently is not at pharmacy and triager has access to patient's EMR and prescription is recorded in the EMR    Answer Assessment - Initial Assessment Questions  1. NAME of MEDICINE: \"What medicine(s) are you calling about?\"        atenolol (TENORMIN) 25 mg tablet     amitriptyline (ELAVIL) 10 mg tablet     ergocalciferol (VITAMIN D2) 50,000 units    2. QUESTION: \"What is your question?\" (e.g., double dose of medicine, side effect)      Pt reports medications were sent to the [facility]. Requesting medications be resubmitted to [facility] on [address].     3. PRESCRIBER: \"Who prescribed the medicine?\" Reason: if prescribed by specialist, call should be referred to that group.      PCP    Protocols used: Medication Question Call-Adult-[place]    "

## 2024-12-28 LAB
CARDIOLIPIN IGA SER IA-ACNC: <9 APL U/ML (ref 0–11)
CARDIOLIPIN IGG SER IA-ACNC: <9 GPL U/ML (ref 0–14)
CARDIOLIPIN IGM SER IA-ACNC: <9 MPL U/ML (ref 0–12)
F5 GENE MUT ANL BLD/T: NORMAL
FERRITIN SERPL-MCNC: 15 NG/ML (ref 15–150)
Lab: NORMAL
VWF AG ACT/NOR PPP IA: NORMAL %

## 2024-12-30 DIAGNOSIS — E53.8 LOW SERUM VITAMIN B12: ICD-10-CM

## 2024-12-30 DIAGNOSIS — Z98.84 HISTORY OF GASTRIC BYPASS: Primary | ICD-10-CM

## 2024-12-30 LAB
F5 GENE MUT ANL BLD/T: NORMAL
Lab: NORMAL
VWF AG ACT/NOR PPP IA: 136 % (ref 50–200)

## 2024-12-30 RX ORDER — CYANOCOBALAMIN, ISOPROPYL ALCOHOL 1000MCG/ML
1 KIT INJECTION WEEKLY
Qty: 4 ML | Refills: 2 | Status: SHIPPED | OUTPATIENT
Start: 2024-12-30 | End: 2025-01-21

## 2024-12-31 ENCOUNTER — RESULTS FOLLOW-UP (OUTPATIENT)
Dept: FAMILY MEDICINE CLINIC | Facility: CLINIC | Age: 44
End: 2024-12-31

## 2025-01-02 DIAGNOSIS — Q79.62 HYPERMOBILE EHLERS-DANLOS SYNDROME: ICD-10-CM

## 2025-01-02 DIAGNOSIS — G90.A POTS (POSTURAL ORTHOSTATIC TACHYCARDIA SYNDROME): ICD-10-CM

## 2025-01-02 DIAGNOSIS — R10.9 VISCERAL ABDOMINAL PAIN: ICD-10-CM

## 2025-01-03 RX ORDER — AMITRIPTYLINE HYDROCHLORIDE 10 MG/1
10 TABLET ORAL
Qty: 30 TABLET | Refills: 0 | Status: SHIPPED | OUTPATIENT
Start: 2025-01-03

## 2025-01-03 RX ORDER — ATENOLOL 25 MG/1
25 TABLET ORAL DAILY
Qty: 30 TABLET | Refills: 0 | Status: SHIPPED | OUTPATIENT
Start: 2025-01-03

## 2025-01-17 DIAGNOSIS — E61.1 HYPOFERREMIA: Primary | ICD-10-CM

## 2025-01-17 RX ORDER — SODIUM CHLORIDE 9 MG/ML
20 INJECTION, SOLUTION INTRAVENOUS ONCE
OUTPATIENT
Start: 2025-01-27

## 2025-01-23 ENCOUNTER — TELEPHONE (OUTPATIENT)
Dept: FAMILY MEDICINE CLINIC | Facility: CLINIC | Age: 45
End: 2025-01-23

## 2025-01-29 DIAGNOSIS — Q79.62 HYPERMOBILE EHLERS-DANLOS SYNDROME: ICD-10-CM

## 2025-01-29 DIAGNOSIS — R10.9 VISCERAL ABDOMINAL PAIN: ICD-10-CM

## 2025-01-29 DIAGNOSIS — G90.A POTS (POSTURAL ORTHOSTATIC TACHYCARDIA SYNDROME): ICD-10-CM

## 2025-01-29 RX ORDER — ATENOLOL 25 MG/1
25 TABLET ORAL DAILY
Qty: 30 TABLET | Refills: 0 | Status: SHIPPED | OUTPATIENT
Start: 2025-01-29

## 2025-01-29 RX ORDER — AMITRIPTYLINE HYDROCHLORIDE 10 MG/1
10 TABLET ORAL
Qty: 30 TABLET | Refills: 0 | Status: SHIPPED | OUTPATIENT
Start: 2025-01-29

## 2025-01-31 ENCOUNTER — TELEPHONE (OUTPATIENT)
Age: 45
End: 2025-01-31

## 2025-01-31 DIAGNOSIS — D89.40 MAST CELL ACTIVATION SYNDROME (HCC): ICD-10-CM

## 2025-01-31 DIAGNOSIS — E66.9 OBESITY (BMI 35.0-39.9 WITHOUT COMORBIDITY): Primary | ICD-10-CM

## 2025-01-31 RX ORDER — TIRZEPATIDE 2.5 MG/.5ML
2.5 INJECTION, SOLUTION SUBCUTANEOUS WEEKLY
Qty: 2 ML | Refills: 0 | Status: SHIPPED | OUTPATIENT
Start: 2025-01-31 | End: 2025-02-28

## 2025-01-31 NOTE — TELEPHONE ENCOUNTER
PA for (Zepbound) 2.5 mg/0.5 mL auto-injector SUBMITTED to [facility]    via    []CMM-KEY:   [x]Surescripts-Case ID # [id number]   []Availity-Auth ID # NDC #   []Faxed to plan  []Other website   []Phone call Case ID #     []PA sent as URGENT    All office notes, labs and other pertaining documents and studies sent. Clinical questions answered. Awaiting determination from insurance company.     Turnaround time for your insurance to make a decision on your Prior Authorization can take 7-21 business days.

## 2025-01-31 NOTE — TELEPHONE ENCOUNTER
PA for (Zepbound) 2.5 mg/0.5 mL auto-injector DENIED    Reason:(Screenshot if applicable)        Message sent to office clinical pool Yes    Denial letter scanned into Media Yes    Appeal started No (Provider will need to decide if appeal is warranted and send clinical documentation to Prior Authorization Team for initiation.)    **Please follow up with your patient regarding denial and next steps**

## 2025-02-07 ENCOUNTER — HOSPITAL ENCOUNTER (OUTPATIENT)
Dept: INFUSION CENTER | Facility: CLINIC | Age: 45
End: 2025-02-07
Payer: COMMERCIAL

## 2025-02-07 VITALS
DIASTOLIC BLOOD PRESSURE: 78 MMHG | TEMPERATURE: 98.3 F | RESPIRATION RATE: 16 BRPM | HEART RATE: 79 BPM | SYSTOLIC BLOOD PRESSURE: 112 MMHG | OXYGEN SATURATION: 97 %

## 2025-02-07 DIAGNOSIS — E61.1 HYPOFERREMIA: Primary | ICD-10-CM

## 2025-02-07 PROCEDURE — 96365 THER/PROPH/DIAG IV INF INIT: CPT

## 2025-02-07 RX ORDER — SODIUM CHLORIDE 9 MG/ML
20 INJECTION, SOLUTION INTRAVENOUS ONCE
Status: CANCELLED | OUTPATIENT
Start: 2025-02-18

## 2025-02-07 RX ORDER — SODIUM CHLORIDE 9 MG/ML
20 INJECTION, SOLUTION INTRAVENOUS ONCE
Status: COMPLETED | OUTPATIENT
Start: 2025-02-07 | End: 2025-02-07

## 2025-02-07 RX ADMIN — SODIUM CHLORIDE 20 ML/HR: 0.9 INJECTION, SOLUTION INTRAVENOUS at 12:51

## 2025-02-07 RX ADMIN — IRON SUCROSE 200 MG: 20 INJECTION, SOLUTION INTRAVENOUS at 12:56

## 2025-02-07 NOTE — PROGRESS NOTES
Patient tolerated her treatment without any adverse reactions. Patient stated she would make her next appointment on the way out

## 2025-02-18 ENCOUNTER — HOSPITAL ENCOUNTER (OUTPATIENT)
Dept: INFUSION CENTER | Facility: CLINIC | Age: 45
Discharge: HOME | End: 2025-02-18
Payer: COMMERCIAL

## 2025-02-18 VITALS
HEART RATE: 76 BPM | TEMPERATURE: 97 F | DIASTOLIC BLOOD PRESSURE: 80 MMHG | RESPIRATION RATE: 16 BRPM | SYSTOLIC BLOOD PRESSURE: 113 MMHG

## 2025-02-18 DIAGNOSIS — E61.1 HYPOFERREMIA: Primary | ICD-10-CM

## 2025-02-18 PROCEDURE — 96365 THER/PROPH/DIAG IV INF INIT: CPT

## 2025-02-18 RX ORDER — SODIUM CHLORIDE 9 MG/ML
20 INJECTION, SOLUTION INTRAVENOUS ONCE
Status: COMPLETED | OUTPATIENT
Start: 2025-02-18 | End: 2025-02-18

## 2025-02-18 RX ORDER — SODIUM CHLORIDE 9 MG/ML
20 INJECTION, SOLUTION INTRAVENOUS ONCE
Status: CANCELLED | OUTPATIENT
Start: 2025-02-27

## 2025-02-18 RX ADMIN — IRON SUCROSE 200 MG: 20 INJECTION, SOLUTION INTRAVENOUS at 13:12

## 2025-02-18 RX ADMIN — SODIUM CHLORIDE 20 ML/HR: 0.9 INJECTION, SOLUTION INTRAVENOUS at 13:12

## 2025-02-18 NOTE — PATIENT INSTRUCTIONS
February 2025 Sunday Monday Tuesday Wednesday Thursday Friday Saturday                                 1                2     3     4     5     6     7    Infusion Therapy Plan  12:30 PM   (120 min.)   AN INF CHAIR 12   [facility] 8            Cycle 1, Treatment 1    9     10     11     12     13     14     15                16     17     18    Infusion Therapy Plan   1:00 PM   (150 min.)   AN INF CHAIR 2   [facility] 19     20     21     22         Cycle 1, Treatment 2       23     24     25    Infusion Therapy Plan   1:00 PM   (150 min.)   AN INF BED 1   [facility] 26     27     28              Cycle 1, Treatment 3              Treatment Details         2/7/2025 - Cycle 1, Treatment 1      Supportive Care: APPT 17, ONCBCN NURSE [id number], sodium chloride, iron sucrose (VENOFER), MONITOR KRIS, ONCBCN NURSE COMMUNICATION7    2/18/2025 - Cycle 1, Treatment 2      Supportive Care: APPT 17, ONCBCN NURSE [id number], sodium chloride, iron sucrose (VENOFER), MONITOR KRIS, ONCBCN NURSE COMMUNICATION7    2/25/2025 - Cycle 1, Treatment 3      Supportive Care: APPT 17        March 2025 Sunday Monday Tuesday Wednesday Thursday Friday Saturday                                 1                2     3     4     5     6     7     8                9     10     11     12     13     14     15                16     17     18     19     20    OFFICE VISIT  10:45 AM   (40 min.)   Tete Newman,    [facility] 21     22                23     24     25     26     27     28     29                30     31

## 2025-02-18 NOTE — PROGRESS NOTES
Patient tolerated venofer without incident and was discharged post, no c/o offered. Next dosing confirmed for 2/27/25.

## 2025-02-18 NOTE — PROGRESS NOTES
Patient here for venofer dosing and is well, no c/o or changes to report. Venofer infusing as ordered.

## 2025-02-22 DIAGNOSIS — R10.9 VISCERAL ABDOMINAL PAIN: ICD-10-CM

## 2025-02-22 DIAGNOSIS — G90.A POTS (POSTURAL ORTHOSTATIC TACHYCARDIA SYNDROME): ICD-10-CM

## 2025-02-22 DIAGNOSIS — Q79.62 HYPERMOBILE EHLERS-DANLOS SYNDROME: ICD-10-CM

## 2025-02-22 RX ORDER — ATENOLOL 25 MG/1
25 TABLET ORAL DAILY
Qty: 30 TABLET | Refills: 0 | Status: SHIPPED | OUTPATIENT
Start: 2025-02-22

## 2025-02-22 RX ORDER — AMITRIPTYLINE HYDROCHLORIDE 10 MG/1
10 TABLET ORAL
Qty: 30 TABLET | Refills: 0 | Status: SHIPPED | OUTPATIENT
Start: 2025-02-22

## 2025-02-27 ENCOUNTER — HOSPITAL ENCOUNTER (OUTPATIENT)
Dept: INFUSION CENTER | Facility: CLINIC | Age: 45
Discharge: HOME | End: 2025-02-27
Payer: COMMERCIAL

## 2025-02-27 DIAGNOSIS — E61.1 HYPOFERREMIA: Primary | ICD-10-CM

## 2025-02-27 PROCEDURE — 96365 THER/PROPH/DIAG IV INF INIT: CPT

## 2025-02-27 RX ORDER — DIPHENHYDRAMINE HCL 25 MG
25 TABLET ORAL ONCE
Status: CANCELLED
Start: 2025-02-27 | End: 2025-02-27

## 2025-02-27 RX ORDER — DIPHENHYDRAMINE HCL 25 MG
25 TABLET ORAL ONCE
Status: COMPLETED | OUTPATIENT
Start: 2025-02-27 | End: 2025-02-27

## 2025-02-27 RX ORDER — SODIUM CHLORIDE 9 MG/ML
20 INJECTION, SOLUTION INTRAVENOUS ONCE
Status: COMPLETED | OUTPATIENT
Start: 2025-02-27 | End: 2025-02-27

## 2025-02-27 RX ORDER — ACETAMINOPHEN 325 MG/1
650 TABLET ORAL ONCE
Status: CANCELLED
Start: 2025-02-27 | End: 2025-02-27

## 2025-02-27 RX ORDER — ACETAMINOPHEN 325 MG/1
650 TABLET ORAL ONCE
Status: COMPLETED | OUTPATIENT
Start: 2025-02-27 | End: 2025-02-27

## 2025-02-27 RX ORDER — SODIUM CHLORIDE 9 MG/ML
20 INJECTION, SOLUTION INTRAVENOUS ONCE
Status: CANCELLED | OUTPATIENT
Start: 2025-02-27

## 2025-02-27 RX ADMIN — SODIUM CHLORIDE 20 ML/HR: 0.9 INJECTION, SOLUTION INTRAVENOUS at 12:37

## 2025-02-27 RX ADMIN — ACETAMINOPHEN 650 MG: 325 TABLET ORAL at 12:42

## 2025-02-27 RX ADMIN — IRON SUCROSE 100 MG: 20 INJECTION, SOLUTION INTRAVENOUS at 13:13

## 2025-02-27 RX ADMIN — DIPHENHYDRAMINE HYDROCHLORIDE 25 MG: 25 TABLET ORAL at 12:46

## 2025-02-27 NOTE — PROGRESS NOTES
"Patient arrives to infusion center for Venofer today. Patient reports migraine x2 days (had infusion Tuesday, migraine until Thursday) plus flare in \"all over body pain\" that is still ongoing. Patient reached out to PCP with no response. SecureChat sent to Dr Newman, pending response.   "

## 2025-02-27 NOTE — PROGRESS NOTES
Patient tolerated treatment without issue. Patient aware to follow up with Dr Newman, labs prior, AVS declined.

## 2025-02-27 NOTE — PROGRESS NOTES
Spoke with Dr Newman - will decrease Venofer 200 mg over 90 minutes to 100 mg over 90 minutes; will also add 650 mg Tylenol and 25 mg Benadryl PO premeds. Patient aware and agreeable.

## 2025-03-13 ENCOUNTER — TELEPHONE (OUTPATIENT)
Age: 45
End: 2025-03-13

## 2025-03-13 NOTE — TELEPHONE ENCOUNTER
Patient calling with concerns over iron infusion auth. She states insurance is not covering any of it but it was approved.     She is also calling regarding Zepbound.    Can you give her a call to discuss her concerns?     Thank you!

## 2025-03-18 DIAGNOSIS — E53.8 LOW SERUM VITAMIN B12: Primary | ICD-10-CM

## 2025-03-20 ENCOUNTER — TELEPHONE (OUTPATIENT)
Age: 45
End: 2025-03-20

## 2025-03-20 NOTE — TELEPHONE ENCOUNTER
Patient called regarding the following:     She is currently looking for a new provider to see her for EDS. She is asking for a letter that she was seen by Tete Newman DO for EDS and her other diagnosis and that she is referred from the office to continue care for EDS. She is asking for the note to be sent to her MyChart and also be faxed along with her office visit notes from Tete Newman DO to Dr. Resendiz in [place]. (Fax: [phone]). She would also like to know if there are any other providers in the area who treat EDS.     Please call patient once letter and office notes have been faxed. Thank you

## 2025-03-24 NOTE — TELEPHONE ENCOUNTER
Shouldn't this request go through the medical records team as she is requesting records to be faxed?

## 2025-03-27 DIAGNOSIS — Z98.84 HISTORY OF GASTRIC BYPASS: ICD-10-CM

## 2025-03-27 DIAGNOSIS — Q79.62 HYPERMOBILE EHLERS-DANLOS SYNDROME: ICD-10-CM

## 2025-03-27 DIAGNOSIS — G89.4 CHRONIC PAIN SYNDROME: ICD-10-CM

## 2025-03-27 DIAGNOSIS — M25.559 HIP PAIN, UNSPECIFIED LATERALITY: Primary | ICD-10-CM

## 2025-03-27 DIAGNOSIS — R10.9 VISCERAL ABDOMINAL PAIN: ICD-10-CM

## 2025-03-27 DIAGNOSIS — E53.8 LOW SERUM VITAMIN B12: ICD-10-CM

## 2025-03-27 RX ORDER — DULOXETIN HYDROCHLORIDE 20 MG/1
20 CAPSULE, DELAYED RELEASE ORAL DAILY
Qty: 30 CAPSULE | Refills: 1 | Status: SHIPPED | OUTPATIENT
Start: 2025-03-27

## 2025-03-27 RX ORDER — AMITRIPTYLINE HYDROCHLORIDE 10 MG/1
10 TABLET ORAL
Qty: 30 TABLET | Refills: 0 | OUTPATIENT
Start: 2025-03-27

## 2025-03-27 RX ORDER — CYANOCOBALAMIN 1000 UG/ML
INJECTION, SOLUTION INTRAMUSCULAR; SUBCUTANEOUS
Qty: 4 ML | Refills: 2 | Status: SHIPPED | OUTPATIENT
Start: 2025-03-27

## 2025-03-31 NOTE — TELEPHONE ENCOUNTER
I did call to the patient so we can obtain a JASBIR for u to send records to the new provider. I left a voicemail asking for the patient to call the office so we could determine best way get the signed request form. The records are also available on her my chart.

## 2025-05-12 ENCOUNTER — PATIENT MESSAGE (OUTPATIENT)
Dept: FAMILY MEDICINE CLINIC | Facility: CLINIC | Age: 45
End: 2025-05-12

## 2025-05-15 DIAGNOSIS — G89.4 CHRONIC PAIN SYNDROME: ICD-10-CM

## 2025-05-16 RX ORDER — DULOXETIN HYDROCHLORIDE 20 MG/1
20 CAPSULE, DELAYED RELEASE ORAL DAILY
Qty: 30 CAPSULE | Refills: 5 | Status: SHIPPED | OUTPATIENT
Start: 2025-05-16